# Patient Record
Sex: MALE | Race: WHITE | Employment: FULL TIME | URBAN - METROPOLITAN AREA
[De-identification: names, ages, dates, MRNs, and addresses within clinical notes are randomized per-mention and may not be internally consistent; named-entity substitution may affect disease eponyms.]

---

## 2020-02-14 ENCOUNTER — OFFICE VISIT (OUTPATIENT)
Dept: INTERNAL MEDICINE CLINIC | Facility: CLINIC | Age: 30
End: 2020-02-14

## 2020-02-14 VITALS
WEIGHT: 222.81 LBS | SYSTOLIC BLOOD PRESSURE: 114 MMHG | HEART RATE: 64 BPM | HEIGHT: 68 IN | BODY MASS INDEX: 33.77 KG/M2 | DIASTOLIC BLOOD PRESSURE: 69 MMHG

## 2020-02-14 DIAGNOSIS — M54.50 CHRONIC MIDLINE LOW BACK PAIN WITHOUT SCIATICA: Primary | ICD-10-CM

## 2020-02-14 DIAGNOSIS — G89.29 CHRONIC MIDLINE LOW BACK PAIN WITHOUT SCIATICA: Primary | ICD-10-CM

## 2020-02-14 PROCEDURE — 99202 OFFICE O/P NEW SF 15 MIN: CPT | Performed by: INTERNAL MEDICINE

## 2020-02-14 NOTE — PATIENT INSTRUCTIONS
When significant, rest your back and apply heat 2-3 times daily, and take Tylenol or Advil as needed for pain relief. Regularly performing stretching and strengthening exercises may help lessen your back pain.

## 2020-02-14 NOTE — PROGRESS NOTES
Jabari Whitman is a 34year old male. Patient presents with:  Low Back Pain    HPI:   For the past 1-2 years, he has had intermittent episodes of midline low back pain occurring near his tailbone.   Episodes of pain typically occur after he has been sitting f ASSESSMENT AND PLAN:   1. Chronic midline low back pain without sciatica  Likely musculoskeletal. No evidence of lumbar radiculopathy or other significant etiology.   Episodes mild and fleeting  Rest and heat application b.i.d-t.i.d. as needed  Karolyn

## 2023-06-21 ENCOUNTER — OFFICE VISIT (OUTPATIENT)
Dept: FAMILY MEDICINE CLINIC | Facility: CLINIC | Age: 33
End: 2023-06-21

## 2023-06-21 VITALS
SYSTOLIC BLOOD PRESSURE: 129 MMHG | TEMPERATURE: 97 F | HEIGHT: 67.6 IN | WEIGHT: 243 LBS | HEART RATE: 81 BPM | DIASTOLIC BLOOD PRESSURE: 77 MMHG | BODY MASS INDEX: 37.26 KG/M2

## 2023-06-21 DIAGNOSIS — E78.1 HYPERTRIGLYCERIDEMIA: ICD-10-CM

## 2023-06-21 DIAGNOSIS — Z30.09 VASECTOMY EVALUATION: ICD-10-CM

## 2023-06-21 DIAGNOSIS — E79.0 HYPERURICEMIA: ICD-10-CM

## 2023-06-21 DIAGNOSIS — E66.9 OBESITY (BMI 30-39.9): ICD-10-CM

## 2023-06-21 DIAGNOSIS — Z00.00 WELL ADULT EXAM: Primary | ICD-10-CM

## 2023-06-21 DIAGNOSIS — L98.9 SCALP LESION: ICD-10-CM

## 2023-08-28 ENCOUNTER — OFFICE VISIT (OUTPATIENT)
Dept: SURGERY | Facility: CLINIC | Age: 33
End: 2023-08-28

## 2023-08-28 VITALS — DIASTOLIC BLOOD PRESSURE: 82 MMHG | HEART RATE: 72 BPM | SYSTOLIC BLOOD PRESSURE: 127 MMHG

## 2023-08-28 DIAGNOSIS — Z30.09 FAMILY PLANNING: Primary | ICD-10-CM

## 2023-08-28 NOTE — PROGRESS NOTES
Steven Alvarado MD  Department of Urology  [facility].[place]    T: [phone]  F: [phone]    To: Robin Chen MD   [address]    Re: Jesus Vivas   MRN: [id number]  : 3/16/1990    Dear Robin Chen MD,    Today I had the pleasure of seeing Jesus Vivas in my clinic. As you know, Mr. Zelaya Courser is a pleasant 34year old year old male who I am seeing for vasectomy consult. Patient was last seen in this department on Visit date not found. Briefly, patient is  with children and desires vasectomy as a means of contraception. PAST MEDICAL HISTORY:  History reviewed. No pertinent past medical history. PAST SURGICAL HISTORY:  History reviewed. No pertinent surgical history. ALLERGIES:  No Known Allergies      MEDICATIONS:  No current outpatient medications     FAMILY HISTORY:  Family History   Problem Relation Age of Onset    Cancer Mother 62        Breast        SOCIAL HISTORY:  Social History     Socioeconomic History    Marital status:     Number of children: 0   Occupational History    Occupation:    Tobacco Use    Smoking status: Never    Smokeless tobacco: Never   Vaping Use    Vaping Use: Never used   Substance and Sexual Activity    Alcohol use:  Yes     Alcohol/week: 0.0 standard drinks of alcohol     Comment: rare    Drug use: No   Other Topics Concern    Pt has a pacemaker No    Pt has a defibrillator No    Reaction to local anesthetic No          PHYSICAL EXAMINATION:   23  1530   BP: 127/82   BP Location: Left arm   Patient Position: Sitting   Cuff Size: large   Pulse: 72     CONSTITUTIONAL: No apparent distress, cooperative and communicative  NEUROLOGIC: Alert and oriented   HEAD: Normocephalic, atraumatic   EYES: Sclera non-icteric   ENT: Hearing intact, moist mucous membranes   NECK: No obvious goiter or masses   RESPIRATORY: Normal respiratory effort, Nonlabored breathing on room air  SKIN: No evident rashes   ABDOMEN: Soft, nontender, nondistended, no rebound tenderness, no guarding, no masses  GENITOURINARY: bilateral vasa palpated  DIGITAL RECTAL EXAM: did not perform    REVIEW OF SYSTEMS:    A comprehensive 10-point review of systems was completed. Pertinent positives and negatives are noted in the the HPI. LABORATORY DATA:  none        IMAGING REVIEW:  none     OTHER RELEVANT DATA:   none     IMPRESSION: vasectomy, proceed    We discussed that a vasectomy is intended to be a permanent form of contraception and that if he desires fertility after vasectomy, options included vasectomy reversal and sperm retrieval with possible in vitro fertilization. These options are not always successful and may be very expensive. We also discussed the risks of vasectomy which included symptomatic hematoma and infection (1-2%), chronic scrotal pain (6%; caused by congestive epididymitis, sperm granuloma and infective epididymoorchitis) need for repeat vasectomy (<1% of cases), need for additional procedures, vasectomy failure, and pregnancy (1 in 2000 men). The chronic scrotal discomfort may be no more than a low-grade chronic ache that causes little disability and requires only symptomatic treatment. However, a proportion of patients will be sufficiently debilitated to seek epididymectomy or even orchidectomy (removal of the epididymis and or testicle). Furthermore, for a very small number of patients, even this radical surgery will not provide relief from their scrotal discomfort. We discussed that he will have two small incisions in his scrotum with dissolvable sutures. He was told that vasectomy does NOT produce immediate sterility and that following vasectomy, another form of contraception is required until vas occlusion is confirmed by post vasectomy semen analysis. Post vasectomy semenanalysis will be obtained 12-16 weeks after the vasectomy.  He was told that he may stop using other methods of contraception when examination of one well-mixed, uncentrifuged, fresh post-vasectomy semen specimen shows azoospermia or only rare non-motile sperm. He was once again told that even after vas occlusion is confirmed, vasectomy is not 100% reliable in preventing pregnancy and that the risk of pregnancy after vasectomy is approximately 1 in 2,000 men who have post-vasectomy azoospermia or semenanalysis showing rare non-motile sperm. The reasons are very early recanalization of the vas deferens or the presence of an accessory vas unrecognized at the time of surgery. There have also been cases of DNA-confirmed paternity despite documented azoospermia before and after conception. He was told to refrain from ejaculation for approximately one week after vasectomy. If there are >100,000 non-motile sperm/mL after 6 months post vasectomy, we would trend the semenanalysis for consideration of repeat vasectomy. PLAN:  vasectomy    Thank you for referring this very pleasant patient to my clinic. If you have any questions or concerns, please do not hesitate to contact me. Sincerely,  Jose Aguilar MD    30 minutes were spent on this patient at this visit obtaining a history, reviewing medical records, developing a treatment plan, counseling and discussing treatment strategy with patient, coordination of care and documentation. The [facility]. [facility] makes medical notes available to patients in the interest of transparency. However, please be advised that this is a medical document. It is intended as a peer to peer communication. It is written in medical language and may contain abbreviations or verbiage that are technical and unfamiliar. It may appear blunt or direct. Medical documents are intended to carry relevant information, facts as evident, and the clinical opinion of the practitioner.

## 2024-10-13 ENCOUNTER — APPOINTMENT (OUTPATIENT)
Dept: GENERAL RADIOLOGY | Age: 34
End: 2024-10-13
Attending: Physician Assistant
Payer: COMMERCIAL

## 2024-10-13 ENCOUNTER — HOSPITAL ENCOUNTER (OUTPATIENT)
Age: 34
Discharge: HOME | End: 2024-10-13
Payer: COMMERCIAL

## 2024-10-13 VITALS
HEART RATE: 55 BPM | TEMPERATURE: 97 F | OXYGEN SATURATION: 100 % | SYSTOLIC BLOOD PRESSURE: 117 MMHG | DIASTOLIC BLOOD PRESSURE: 52 MMHG | RESPIRATION RATE: 20 BRPM

## 2024-10-13 DIAGNOSIS — S90.221A SUBUNGUAL HEMATOMA OF RIGHT FOOT, INITIAL ENCOUNTER: ICD-10-CM

## 2024-10-13 DIAGNOSIS — S99.921A INJURY OF TOE ON RIGHT FOOT, INITIAL ENCOUNTER: Primary | ICD-10-CM

## 2024-10-13 PROCEDURE — 73660 X-RAY EXAM OF TOE(S): CPT | Performed by: PHYSICIAN ASSISTANT

## 2024-10-13 PROCEDURE — 99203 OFFICE O/P NEW LOW 30 MIN: CPT | Performed by: PHYSICIAN ASSISTANT

## 2024-10-13 PROCEDURE — 11740 EVACUATION SUBUNGUAL HMTMA: CPT | Performed by: PHYSICIAN ASSISTANT

## 2024-10-13 NOTE — ED PROVIDER NOTES
Chief Complaint   Patient presents with    Toe Pain       History obtained from: patient   services not used    HPI:     Evans Jesus is a 34 year old male who presents with right great toe injury sustained yesterday afternoon. Patient states he [place] the park and his right great toe got caught on sidewalk. Patient notes pain and swelling to toe and discoloration to nail. Denies any other injuries sustained or complaints at this time. Denies numbness, weakness, change in skin temperature, foot pain, ankle pain, nail detachment.     PMH  History reviewed. No pertinent past medical history.    PFSH    PFSH asessment screens reviewed and agree.  Nurses notes reviewed I agree with documentation.    Family History   Problem Relation Age of Onset    Cancer Mother 58        Breast     Family history reviewed with patient/caregiver and is not pertinent to presenting problem.  Social History     Socioeconomic History    Marital status:      Spouse name: Not on file    Number of children: 0    Years of education: Not on file    Highest education level: Not on file   Occupational History    Occupation:    Tobacco Use    Smoking status: Never    Smokeless tobacco: Never   Vaping Use    Vaping status: Never Used   Substance and Sexual Activity    Alcohol use: Yes     Alcohol/week: 0.0 standard drinks of alcohol     Comment: rare    Drug use: No    Sexual activity: Not on file   Other Topics Concern    Grew up on a farm Not Asked    History of tanning Not Asked    Outdoor occupation Not Asked    Pt has a pacemaker No    Pt has a defibrillator No    Reaction to local anesthetic No   Social History Narrative    Not on file     Social Drivers of Health     Financial Resource Strain: Not on file   Food Insecurity: Not on file   Transportation Needs: Not on file   Physical Activity: Not on file   Stress: Not on file   Social Connections: Not on file   Housing Stability: Not on file          ROS:   Positive for stated complaint: right great toe injury   All other systems reviewed and negative except as noted above.  Constitutional and Vital Signs Reviewed.    Physical Exam:     Findings:    /52   Pulse 55   Temp 97.2 °F (36.2 °C) (Temporal)   Resp 20   SpO2 100%   GENERAL: well developed, no acute distress, non-toxic appearing   SKIN: good skin turgor, no obvious rashes  HEAD: normocephalic, atraumatic  EYES: sclera non-icteric bilaterally, conjunctiva clear bilaterally  OROPHARYNX: MMM, maintaining airway and secretions  NECK: no nuchal rigidity, no trismus, no edema, phonation normal    CARDIO: regular rate, DP pulse 2+ bilaterally, cap refill < 2 sec   LUNGS: no increased WOB  EXTREMITIES: tenderness and swelling localized to right great toe, no obvious deformity, compartments soft, CMS intact, subungual hematoma to right great toenail, nail remains attached   NEURO: no focal deficits  PSYCH: alert and oriented x3, answering questions appropriately, mood appropriate    MDM/Assessment/Plan:   Orders for this encounter:    Orders Placed This Encounter    XR TOE(S) (MIN 2 VIEWS), RIGHT 1ST (CPT=73660)     Toe Injury Pt with pain and bruising to 1st toe of R foot after he hit it on a raised   sidewalk yesterday. Pt rated pain 5/10.       Order Specific Question:   What is the Relevant Clinical Indication / Reason for Exam?     Answer:   hit great toe on sidewalk yesterday    Filippo tape (specify site)    Post-op shoe       Labs performed this visit:  No results found for this or any previous visit (from the past 10 hours).    Imaging performed this visit:  XR TOE(S) (MIN 2 VIEWS), RIGHT 1ST (CPT=73660)   Final Result   PROCEDURE: XR TOE(S) (MIN 2 VIEWS), RIGHT 1ST (CPT=73660)       COMPARISON: None.       INDICATIONS: Right great toe pain after injury yesterday.       TECHNIQUE: 3 views were obtained.         FINDINGS:    BONES: Normal. No significant arthropathy, fracture or acute  abnormality.   SOFT TISSUES: Negative. No visible soft tissue swelling.    EFFUSION: None visible.    OTHER: Negative.                    =====   CONCLUSION:        Unremarkable right great toe radiographs                Dictated by (CST): Benjamín Lino MD on 10/13/2024 at 9:33 AM        Finalized by (CST): Benjamín Lino MD on 10/13/2024 at 9:34 AM                   Medical Decision Making  DDx includes fracture vs sprain vs contusion vs subungual hematoma vs other. Patient is overall very well-appearing with stable vitals. No signs of neurovascular compromise or compartment syndrome. No history of immunocompromise. X-ray of right great toe reviewed, no evidence of acute osseous abnormality. Discussed results with patient. Discussed risks vs benefits of trephination of subungual hematoma, patient agreeable, see procedure note below. Antibiotic ointment applied to toenail. Buddy tape applied to 1st and 2nd toes. Post-op shoe applied to right foot. Discussed supportive care including rest, ice, elevation, nail care, and OTC Tylenol/Motrin as needed for pain. Instructed patient to go directly to nearest ER with any worsening or concerning symptoms. Follow up with podiatry.     Amount and/or Complexity of Data Reviewed  Radiology: ordered and independent interpretation performed.    Risk  OTC drugs.      Nail Procedure  Consent obtained: verbal   Consent given by: patient   Risks, benefits, and alternatives were discussed     Procedure explained and questions answered to patient or proxy's satisfaction  Test/imaging results reviewed if applicable   Immediately prior to procedure, a time out was called to verify correct patient, procedure, equipment, support staff, and site/side  Patient identity confirmed verbally with patient and wrist band     Location: right, great toe     Patient prepped and draped in usual sterile fashion   Wound explored through full range of motion and entire depth of wound visualized   Area  cleaned with: saline    Trephination of right great toe subungual hematoma, no significant blood expressed     Dressing: antibiotic ointment, non-adherent dressing, johnathon tape, post-op shoe  Patient tolerated procedure well, no immediate complications       Diagnosis:    ICD-10-CM    1. Injury of toe on right foot, initial encounter  S99.921A       2. Subungual hematoma of right foot, initial encounter  S90.221A           All results reviewed and discussed with patient/patient's family. Patient/patient's family verbalize excellent understanding of instructions and feels comfortable with plan. All of patient's/patient's family's questions were addressed.   See AVS for detailed discharge instructions for your condition today.    Follow Up with:  Emily Claros DPM  [address] [address]  [phone]      Podiatry      Note: This document was dictated using Dragon medical dictation software.  Proofreading was performed to the best of my ability, but errors may be present.    Nova Diaz PA-C

## 2024-10-13 NOTE — ED INITIAL ASSESSMENT (HPI)
Pt with pain and bruising to 1st toe of R foot after he hit it on a raised sidewalk yesterday. Pt rated pain 5/10.

## 2024-10-13 NOTE — DISCHARGE INSTRUCTIONS
Rest, ice, and elevate toe  Alternate Motrin/Tylenol as needed for pain   Keep toenail clean and dry. Gently clean twice daily  Drink plenty of fluids   Get plenty of rest   Avoid excessive twisting, bending, or lifting   Follow up with your primary care provider and/or podiatry